# Patient Record
Sex: MALE | Race: WHITE | NOT HISPANIC OR LATINO
[De-identification: names, ages, dates, MRNs, and addresses within clinical notes are randomized per-mention and may not be internally consistent; named-entity substitution may affect disease eponyms.]

---

## 2021-03-12 PROBLEM — Z00.00 ENCOUNTER FOR PREVENTIVE HEALTH EXAMINATION: Status: ACTIVE | Noted: 2021-03-12

## 2021-04-05 RX ORDER — ROPINIROLE 1 MG/1
1 TABLET, FILM COATED ORAL
Qty: 610 | Refills: 3 | Status: COMPLETED | COMMUNITY
Start: 2021-03-12 | End: 2021-04-05

## 2021-04-24 ENCOUNTER — APPOINTMENT (OUTPATIENT)
Dept: PULMONOLOGY | Facility: CLINIC | Age: 66
End: 2021-04-24
Payer: COMMERCIAL

## 2021-04-24 VITALS
HEART RATE: 70 BPM | BODY MASS INDEX: 30.48 KG/M2 | SYSTOLIC BLOOD PRESSURE: 138 MMHG | WEIGHT: 225 LBS | HEIGHT: 72 IN | RESPIRATION RATE: 14 BRPM | DIASTOLIC BLOOD PRESSURE: 74 MMHG | OXYGEN SATURATION: 95 %

## 2021-04-24 PROCEDURE — 99072 ADDL SUPL MATRL&STAF TM PHE: CPT

## 2021-04-24 PROCEDURE — 99213 OFFICE O/P EST LOW 20 MIN: CPT

## 2021-04-24 NOTE — ASSESSMENT
[FreeTextEntry1] : \par The patient is benefitting from the CPAP.\par \par PLAN:\par cont same meds for RLS/PLMS\par New supplies\par Weight loss\par Stress the need maintain compliance  with the CPAP.\par F/U in 6 months\par \par

## 2021-04-24 NOTE — HISTORY OF PRESENT ILLNESS
[Follow-Up - Routine Clinic] : a routine clinic follow-up of [None] : No associated symptoms are reported [CPAP] : CPAP [Good Compliance] : good compliance with treatment [Fair Tolerance] : fair tolerance of treatment [Good Symptom Control] : good symptom control [Fatigue] : fatigue [Lower Extremity Discomfort] : lower extremity discomfort [Late day/ Evening symptoms] : late day/ evening symptoms [Sleep Disturbances due to LE symptoms] : sleep disturbances due to lower extremity symptoms [Mild] : mild [de-identified] : opening mouth [Awakes Unrefreshed] : does not awaken unrefreshed

## 2021-06-19 ENCOUNTER — RX RENEWAL (OUTPATIENT)
Age: 66
End: 2021-06-19

## 2022-06-15 ENCOUNTER — RX RENEWAL (OUTPATIENT)
Age: 67
End: 2022-06-15

## 2022-07-15 ENCOUNTER — APPOINTMENT (OUTPATIENT)
Age: 67
End: 2022-07-15

## 2022-08-29 ENCOUNTER — APPOINTMENT (OUTPATIENT)
Dept: PULMONOLOGY | Facility: CLINIC | Age: 67
End: 2022-08-29

## 2022-08-29 VITALS
WEIGHT: 218 LBS | HEART RATE: 71 BPM | BODY MASS INDEX: 29.53 KG/M2 | HEIGHT: 72 IN | OXYGEN SATURATION: 96 % | DIASTOLIC BLOOD PRESSURE: 62 MMHG | SYSTOLIC BLOOD PRESSURE: 114 MMHG | RESPIRATION RATE: 14 BRPM

## 2022-08-29 PROCEDURE — 99214 OFFICE O/P EST MOD 30 MIN: CPT

## 2022-08-29 NOTE — ASSESSMENT
[FreeTextEntry1] : \par The patient is benefitting from the CPAP.  The PLMS RLS is stable\par \par PLAN:\par cont same meds for RLS/PLMS I will renew today\par New CPAP supplies\par Weight loss\par Stress the need maintain compliance  with the CPAP.\par F/U in 6 months\par \par

## 2022-08-31 ENCOUNTER — APPOINTMENT (OUTPATIENT)
Dept: PULMONOLOGY | Facility: CLINIC | Age: 67
End: 2022-08-31

## 2023-03-09 ENCOUNTER — APPOINTMENT (OUTPATIENT)
Dept: PULMONOLOGY | Facility: CLINIC | Age: 68
End: 2023-03-09
Payer: COMMERCIAL

## 2023-03-09 VITALS
OXYGEN SATURATION: 97 % | HEART RATE: 66 BPM | RESPIRATION RATE: 14 BRPM | BODY MASS INDEX: 28.89 KG/M2 | WEIGHT: 218 LBS | SYSTOLIC BLOOD PRESSURE: 122 MMHG | HEIGHT: 73 IN | DIASTOLIC BLOOD PRESSURE: 80 MMHG

## 2023-03-09 DIAGNOSIS — G47.33 OBSTRUCTIVE SLEEP APNEA (ADULT) (PEDIATRIC): ICD-10-CM

## 2023-03-09 DIAGNOSIS — G47.61 PERIODIC LIMB MOVEMENT DISORDER: ICD-10-CM

## 2023-03-09 PROCEDURE — 99213 OFFICE O/P EST LOW 20 MIN: CPT

## 2023-03-09 NOTE — ASSESSMENT
[FreeTextEntry1] : \par The patient is benefitting from the CPAP.  The PLMS RLS is stable\par I reviewed his compliance report which was excellent\par \par PLAN:\par cont same meds for RLS/PLMS I will renew today\par New CPAP supplies\par Weight loss\par Stress the need maintain compliance  with the CPAP.\par F/U in 6 months\par \par

## 2023-06-27 RX ORDER — ROPINIROLE 1 MG/1
1 TABLET, FILM COATED ORAL
Qty: 630 | Refills: 4 | Status: ACTIVE | COMMUNITY
Start: 2021-06-19 | End: 1900-01-01

## 2023-07-27 ENCOUNTER — APPOINTMENT (OUTPATIENT)
Dept: PULMONOLOGY | Facility: CLINIC | Age: 68
End: 2023-07-27
Payer: MEDICARE

## 2023-07-27 VITALS
HEIGHT: 73 IN | RESPIRATION RATE: 14 BRPM | SYSTOLIC BLOOD PRESSURE: 148 MMHG | HEART RATE: 90 BPM | OXYGEN SATURATION: 94 % | DIASTOLIC BLOOD PRESSURE: 84 MMHG | BODY MASS INDEX: 29.16 KG/M2 | WEIGHT: 220 LBS

## 2023-07-27 DIAGNOSIS — E66.9 OBESITY, UNSPECIFIED: ICD-10-CM

## 2023-07-27 DIAGNOSIS — G47.33 OBSTRUCTIVE SLEEP APNEA (ADULT) (PEDIATRIC): ICD-10-CM

## 2023-07-27 PROCEDURE — 99213 OFFICE O/P EST LOW 20 MIN: CPT

## 2023-07-28 PROBLEM — E66.9 OBESITY, CLASS I, BMI 30-34.9: Status: ACTIVE | Noted: 2023-07-28

## 2023-07-28 PROBLEM — G47.33 OBSTRUCTIVE SLEEP APNEA, ADULT: Status: ACTIVE | Noted: 2023-03-09

## 2023-07-28 NOTE — REASON FOR VISIT
[Follow-Up] : a follow-up visit [Sleep Apnea] : sleep apnea [TextBox_44] :    Overall doing very well.  He has received his new CPAP unit.  He is very compliant.  He is using his equipment on a nightly basis.  He is not having any difficulties with the machine.  He has been switched over to Medicare and he needs to have follow-up and new prescription.

## 2023-07-28 NOTE — ASSESSMENT
[FreeTextEntry1] : Assessment:\par KOFFI\par Obesity\par \par PLAN:\par The patient is benefitting from the PAP device .  He is very compliant\par New supplies ordered \par Weight loss discussed\par I stressed the need maintain compliance  with the PAP device.\par The patient is not to use an Ozone or UV sterilizer. \par F/U in 12 months\par \par

## 2024-09-03 ENCOUNTER — APPOINTMENT (OUTPATIENT)
Dept: PULMONOLOGY | Facility: CLINIC | Age: 69
End: 2024-09-03
Payer: MEDICARE

## 2024-09-03 ENCOUNTER — RX RENEWAL (OUTPATIENT)
Age: 69
End: 2024-09-03

## 2024-09-03 VITALS
HEIGHT: 73 IN | BODY MASS INDEX: 29.16 KG/M2 | WEIGHT: 220 LBS | RESPIRATION RATE: 12 BRPM | OXYGEN SATURATION: 99 % | SYSTOLIC BLOOD PRESSURE: 120 MMHG | DIASTOLIC BLOOD PRESSURE: 70 MMHG | HEART RATE: 64 BPM

## 2024-09-03 DIAGNOSIS — E66.9 OBESITY, UNSPECIFIED: ICD-10-CM

## 2024-09-03 DIAGNOSIS — G47.33 OBSTRUCTIVE SLEEP APNEA (ADULT) (PEDIATRIC): ICD-10-CM

## 2024-09-03 DIAGNOSIS — G47.61 PERIODIC LIMB MOVEMENT DISORDER: ICD-10-CM

## 2024-09-03 PROCEDURE — 99214 OFFICE O/P EST MOD 30 MIN: CPT

## 2024-09-03 PROCEDURE — G2211 COMPLEX E/M VISIT ADD ON: CPT

## 2024-09-03 NOTE — HISTORY OF PRESENT ILLNESS
[TextBox_4] :  Summary : Patient Pedro Alonso came for a routine checkup. He discussed his  significant other, who had a sudden heart attack while vacationing. 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 He is managing his symptoms with a CPAP machine. - Chief Complaint (CC) : General health check-Up and management of sleep apnea. - History of Present Illness (HPI) : Pedro maintains his sleep apnea with a CPAP machine. He has been experiencing a persistent nasal drip since  incident, which seems to be worsening over time. - Past Medical History : Pedro has a history of sleep apnea, for which he uses a CPAP machine. - Past Surgical History : - Family History : ePdro's significant other and her family members had severe heart problems leading to their demise. - Social History : [place] worked downtown. He was in the vicinity during the  incident. - Review of Systems : Not discussed explicitly. - Medications : He is prescribed Ropinirole.

## 2025-03-03 ENCOUNTER — RX RENEWAL (OUTPATIENT)
Age: 70
End: 2025-03-03

## 2025-09-03 ENCOUNTER — APPOINTMENT (OUTPATIENT)
Dept: PULMONOLOGY | Facility: CLINIC | Age: 70
End: 2025-09-03